# Patient Record
Sex: FEMALE | Race: WHITE
[De-identification: names, ages, dates, MRNs, and addresses within clinical notes are randomized per-mention and may not be internally consistent; named-entity substitution may affect disease eponyms.]

---

## 2017-12-28 ENCOUNTER — HOSPITAL ENCOUNTER (OUTPATIENT)
Dept: HOSPITAL 45 - C.RAD1850 | Age: 57
Discharge: HOME | End: 2017-12-28
Attending: FAMILY MEDICINE
Payer: COMMERCIAL

## 2017-12-28 ENCOUNTER — HOSPITAL ENCOUNTER (OUTPATIENT)
Dept: HOSPITAL 45 - C.LABPBG | Age: 57
Discharge: HOME | End: 2017-12-28
Attending: FAMILY MEDICINE
Payer: COMMERCIAL

## 2017-12-28 DIAGNOSIS — M25.561: ICD-10-CM

## 2017-12-28 DIAGNOSIS — M25.469: Primary | ICD-10-CM

## 2017-12-28 DIAGNOSIS — Z13.220: Primary | ICD-10-CM

## 2017-12-28 DIAGNOSIS — M17.11: ICD-10-CM

## 2017-12-28 NOTE — DIAGNOSTIC IMAGING REPORT
R KNEE 1 OR 2 VIEWS ROUTINE



CLINICAL HISTORY: M25.561 Right knee painM25.469 Knee [id number]

pain



COMPARISON: None.



DISCUSSION: Considerable degenerative change of the medial joint compartment as

well as patellofemoral compartment. Mild degenerative changes lateral

compartment with moderate osteophytic change from the medial as well as the

lateral femoral condyles.



No significant joint effusion. No evidence for fracture or dislocation. There is

no evidence for soft tissue swelling.



IMPRESSION: Considerable degenerative change primarily of the medial and

patellofemoral joint compartments. No acute process.











The above report was generated using voice recognition software.  It may contain

grammatical, syntax or spelling errors.







Electronically signed by:  Gregorio Stanton M.D.

12/28/2017 1:11 PM



Dictated Date/Time:  12/28/2017 1:11 PM

## 2017-12-29 LAB
CHOLEST/HDLC SERPL: 4.2 {RATIO}
GLUCOSE UR QL: 59 MG/DL
KETONES UR QL STRIP: 165 MG/DL
NITRITE UR QL STRIP: 129 MG/DL (ref 0–150)
PH UR: 250 MG/DL (ref 0–200)
VERY LOW DENSITY LIPOPROT CALC: 26 MG/DL

## 2018-01-29 ENCOUNTER — HOSPITAL ENCOUNTER (OUTPATIENT)
Dept: HOSPITAL 45 - C.LABPBG | Age: 58
Discharge: HOME | End: 2018-01-29
Attending: FAMILY MEDICINE
Payer: COMMERCIAL

## 2018-01-29 DIAGNOSIS — R59.0: Primary | ICD-10-CM

## 2018-01-29 LAB
BASOPHILS # BLD: 0.02 K/UL (ref 0–0.2)
BASOPHILS NFR BLD: 0.4 %
EOS ABS #: 0.02 K/UL (ref 0–0.5)
EOSINOPHIL NFR BLD AUTO: 300 K/UL (ref 130–400)
HCT VFR BLD CALC: 42.4 % (ref 37–47)
HGB BLD-MCNC: 13.9 G/DL (ref 12–16)
IG#: 0.01 K/UL (ref 0–0.02)
IMM GRANULOCYTES NFR BLD AUTO: 31.2 %
LYMPHOCYTES # BLD: 1.65 K/UL (ref 1.2–3.4)
MCH RBC QN AUTO: 30.5 PG (ref 25–34)
MCHC RBC AUTO-ENTMCNC: 32.8 G/DL (ref 32–36)
MCV RBC AUTO: 93 FL (ref 80–100)
MONO ABS #: 0.29 K/UL (ref 0.11–0.59)
MONOCYTES NFR BLD: 5.5 %
NEUT ABS #: 3.3 K/UL (ref 1.4–6.5)
NEUTROPHILS # BLD AUTO: 0.4 %
NEUTROPHILS NFR BLD AUTO: 62.3 %
PMV BLD AUTO: 10.3 FL (ref 7.4–10.4)
RED CELL DISTRIBUTION WIDTH CV: 13.1 % (ref 11.5–14.5)
RED CELL DISTRIBUTION WIDTH SD: 44.4 FL (ref 36.4–46.3)
WBC # BLD AUTO: 5.29 K/UL (ref 4.8–10.8)

## 2018-02-05 ENCOUNTER — HOSPITAL ENCOUNTER (OUTPATIENT)
Dept: HOSPITAL 45 - C.MAMM | Age: 58
Discharge: HOME | End: 2018-02-05
Attending: FAMILY MEDICINE
Payer: COMMERCIAL

## 2018-02-05 DIAGNOSIS — Z12.31: Primary | ICD-10-CM

## 2018-02-05 DIAGNOSIS — M85.89: ICD-10-CM

## 2018-02-05 NOTE — MAMMOGRAPHY REPORT
THIS REPORT HAS BEEN AMENDED.  

BILATERAL DIGITAL SCREENING MAMMOGRAM TOMOSYNTHESIS WITH CAD: 2/5/2018

CLINICAL HISTORY: Routine screening.  Patient has no complaints.  





TECHNIQUE:  Breast tomosynthesis in addition to standard 2D mammography was performed. Current study 
was also evaluated with a Computer Aided Detection (CAD) system.  



COMPARISON: No prior exams were available for comparison.   



BREAST COMPOSITION:  There are scattered areas of fibroglandular density in both breasts.  



FINDINGS:  No suspicious mass, architectural distortion or cluster of microcalcifications is seen.  



IMPRESSION:  ACR BI-RADS CATEGORY 1: NEGATIVE

There is no mammographic evidence of malignancy.  Prior outside mammograms are currently being reques
gavi and if obtained they will be reviewed, compared to the current exam to assess for any more subtle
 changes, and an addendum will be made to this report.  Otherwise, a 1 year screening mammogram is re
commended.  



The patient will receive written notification of the results.  





Approximately 10% of breast cancers are not detected with mammography. A negative mammographic report
 should not delay biopsy if a clinically suggestive mass is present.



Corazon Knight M.D.          

ay/:2/5/2018 13:35:35  



Imaging Technologist: Lanny VALLE(YOSHI)(M), [facility]

letter sent: Normal 1/2  

BI-RADS Code: ACR BI-RADS Category 1: Negative   





AMENDMENT: 2/7/2018   Corazon Knight M.D. 

Prior outside mammograms from [facility] dated 10/13/2009, 11/05/2010, 02/08/2012, 02/14/2
013, 04/17/2014, 04/21/2015 and 04/05/2016 became available for review.  There has been no significan
t interval change comparing to the prior outside mammograms.  No new suspicious masses, calcification
s, asymmetries or areas of architectural distortion are identified.  Recommend routine screening mamm
ography in one year.



Amended BI-RADS: ACR BI-RADS Category 1: Negative 

letter sent: Normal 1/2